# Patient Record
Sex: FEMALE | Race: WHITE | NOT HISPANIC OR LATINO | URBAN - METROPOLITAN AREA
[De-identification: names, ages, dates, MRNs, and addresses within clinical notes are randomized per-mention and may not be internally consistent; named-entity substitution may affect disease eponyms.]

---

## 2024-01-21 ENCOUNTER — EMERGENCY (EMERGENCY)
Facility: HOSPITAL | Age: 34
LOS: 1 days | Discharge: HOME | End: 2024-01-21
Admitting: EMERGENCY MEDICINE
Payer: COMMERCIAL

## 2024-01-21 VITALS
HEART RATE: 93 BPM | WEIGHT: 164.91 LBS | DIASTOLIC BLOOD PRESSURE: 82 MMHG | TEMPERATURE: 99 F | OXYGEN SATURATION: 98 % | SYSTOLIC BLOOD PRESSURE: 139 MMHG | RESPIRATION RATE: 18 BRPM

## 2024-01-21 DIAGNOSIS — R07.81 PLEURODYNIA: ICD-10-CM

## 2024-01-21 DIAGNOSIS — S22.31XA FRACTURE OF ONE RIB, RIGHT SIDE, INITIAL ENCOUNTER FOR CLOSED FRACTURE: ICD-10-CM
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
## 2024-01-21 PROCEDURE — 71101 X-RAY EXAM UNILAT RIBS/CHEST: CPT | Mod: 26,RT

## 2024-01-21 PROCEDURE — 71101 X-RAY EXAM UNILAT RIBS/CHEST: CPT

## 2024-01-21 PROCEDURE — 96372 THER/PROPH/DIAG INJ SC/IM: CPT

## 2024-01-21 PROCEDURE — 99283 EMERGENCY DEPT VISIT LOW MDM: CPT | Mod: 25

## 2024-01-21 PROCEDURE — 99284 EMERGENCY DEPT VISIT MOD MDM: CPT

## 2024-01-21 RX ORDER — OXYCODONE AND ACETAMINOPHEN 5; 325 MG/1; MG/1
1 TABLET ORAL
Qty: 10 | Refills: 0
Start: 2024-01-21 | End: 2024-01-23

## 2024-01-21 RX ORDER — KETOROLAC TROMETHAMINE 30 MG/ML
15 SYRINGE (ML) INJECTION ONCE
Refills: 0 | Status: DISCONTINUED | OUTPATIENT
Start: 2024-01-21 | End: 2024-01-21

## 2024-01-21 RX ORDER — LIDOCAINE 4 G/100G
1 CREAM TOPICAL ONCE
Refills: 0 | Status: COMPLETED | OUTPATIENT
Start: 2024-01-21 | End: 2024-01-21

## 2024-01-21 RX ORDER — LIDOCAINE 4 G/100G
1 CREAM TOPICAL
Qty: 2 | Refills: 0
Start: 2024-01-21

## 2024-01-21 RX ADMIN — Medication 15 MILLIGRAM(S): at 18:55

## 2024-01-21 RX ADMIN — LIDOCAINE 1 PATCH: 4 CREAM TOPICAL at 18:54

## 2024-01-21 NOTE — ED PROVIDER NOTE - PATIENT PORTAL LINK FT
You can access the FollowMyHealth Patient Portal offered by [facility] by registering at the following website: [email or web address]. By joining [email or web address]’s FollowMyHealth portal, you will also be able to view your health information using other applications (apps) compatible with our system.

## 2024-01-21 NOTE — ED PROVIDER NOTE - NS ED ROS FT
Constitutional: No fever. No chills.  Eyes: No redness. No discharge. No vision change.   ENT: No sore throat. No ear pain.  Cardiovascular: No chest pain. No leg swelling.  Respiratory: No cough. No shortness of breath. +rib pain.  GI: No abdominal pain. No vomiting. No diarrhea.   MSK: No joint pain. No back pain.   Skin: No rash. No abrasions.   Neuro: No numbness. No weakness.   Psych: No known mental health issues. stairs to enter home/stairs within home

## 2024-01-21 NOTE — ED ADULT NURSE NOTE - ALCOHOL PRE SCREEN (AUDIT - C)
I sent in antiviral----does she want Lyrica or gabapentin for the pain?  
JANIE RN called stating that she believes pt has shingles on R shoulder down into arm and chest. C/o pain x 1 wk, rash is more recent.  [facility].     Pt has appt 8/14.  
LM asking Reta if pain med needed.   
Statement Selected

## 2024-01-21 NOTE — ED PROVIDER NOTE - OBJECTIVE STATEMENT
34yo female with no reported pmhx presents with right rib pain today. Pt reports fall on [place], does not remember event due to intoxication. Had mild rib pain since that time. Today states she turned and abruptly felt a pop with immediate pain in the right anterior ribs. Denies fever, chills, chest pain, shortness of breath, abdominal pain, vomiting. Has not taken anything for pain.

## 2024-01-21 NOTE — ED PROVIDER NOTE - CLINICAL SUMMARY MEDICAL DECISION MAKING FREE TEXT BOX
Pt is afebrile and hemodynamically stable. No hypoxia. Lungs CTAB. She has reproducible pain over the right anterior lower ribs. She has right 5th rib fracture on xray. Pain controlled in ED with percocet and ibuprofen. Will continue supportive care. Will f/u with pmd. Return precautions given.

## 2024-01-21 NOTE — ED PROVIDER NOTE - PHYSICAL EXAMINATION
VITAL SIGNS: I have reviewed nursing notes and confirm.  CONSTITUTIONAL: Well-developed; in no acute distress.   SKIN:  warm and dry, no acute rash.   HEAD:  normocephalic, atraumatic.  EYES: PERRL, EOM intact; conjunctiva and sclera clear.  ENT: No nasal discharge; airway clear.   NECK: Supple; non tender.  CARD: S1, S2 normal; no murmurs, gallops, or rubs. Regular rate and rhythm.   RESP:  Clear to auscultation b/l, no wheezes, rales or rhonchi. TTP anterior lower right ribs without step off or deformity.   ABD: Normal bowel sounds; soft; non-distended; non-tender; no guarding/ rebound.  EXT: Normal ROM. No clubbing, cyanosis or edema. 2+ pulses to b/l ue/le.  NEURO: Alert, oriented, grossly unremarkable  PSYCH: Cooperative, mood and affect appropriate.

## 2024-01-21 NOTE — ED PROVIDER NOTE - NSFOLLOWUPINSTRUCTIONS_ED_ALL_ED_FT
Take ibuprofen 600mg up to three times per day for pain. Take one tab of percocet up to three times a day for severe pain. Do not drive while taking this medication.    Apply a lidocaine patch to area once daily for additional relief.    You can use ice packs or moist heat for pain, whichever feels best.    WHAT YOU NEED TO KNOW:    What is a rib fracture? A rib fracture is a crack or break in a rib.  Rib Fracture    What else do I need to know about rib fractures?    The most common cause is blunt trauma from a fall or car accident. Trauma can increase your risk for organ damage when your rib is fractured.    Older age, osteoporosis, or a tumor can increase your risk for rib fractures.    A stress fracture can happen in your upper or middle ribs. Stress fractures can happen when you have a forceful long-term cough. They can also be caused by forceful athletic movements, such as in golf, throwing, or rowing.    A condition called flail chest occurs if 3 or more of your ribs are broken in 2 or more places. This condition may make it hard for you to breathe.  What are the signs and symptoms of a rib fracture?    Chest wall pain that worsens when you breathe, move, or cough    Bruising or swelling near your injury    Shortness of breath or difficulty taking a deep breath  How is a rib fracture diagnosed? Your healthcare provider will ask about your injury and examine you. He or she will look for any signs of bleeding or bruising. He or she will also ask about your breathing and pain. An x-ray or CT scan may show the fracture or other injuries. You may be given contrast liquid to help the fracture show up better in the pictures. Tell the healthcare provider if you have ever had an allergic reaction to contrast liquid.    How is a rib fracture treated?    Medicines:  NSAIDs, such as ibuprofen, help decrease swelling, pain, and fever. This medicine is available with or without a doctor's order. NSAIDs can cause stomach bleeding or kidney problems in certain people. If you take blood thinner medicine, always ask your healthcare provider if NSAIDs are safe for you. Always read the medicine label and follow directions.    Prescription pain medicine may be given. Ask your healthcare provider how to take this medicine safely. Some prescription pain medicines contain acetaminophen. Do not take other medicines that contain acetaminophen without talking to your healthcare provider. Too much acetaminophen may cause liver damage. Prescription pain medicine may cause constipation. Ask your healthcare provider how to prevent or treat constipation.    Intercostal nerve block may be given to numb the injured area for about 6 hours. It is given as a shot between 2 of your ribs in the fractured area. You may need this if your pain continues or is getting worse even after you take oral pain medicines.    Surgery may be needed if your rib fracture is severe or several ribs are badly broken. Surgery is often needed for a flail chest.  How can I manage my symptoms?    Take deep breaths and cough 10 times each hour. This will decrease your risk for a lung infection. Hug a pillow on your injured side to decrease pain while you take deep breaths. Take a deep breath and hold it for as long as you can. Let the air out and then cough. Deep breaths help open your airway. You may be given an incentive spirometer to help you take deep breaths. Put the plastic piece in your mouth and take a slow, deep breath, then let the air out and cough. Repeat these steps 10 times every hour.  How to use and Incentive Spirometer      Rest and limit activity as directed. Do not pull, push, or lift objects. Start to do more as your pain decreases. Ask your healthcare provider how much activity you can do.    Apply ice on your chest near your fractured rib for 15 to 20 minutes every hour or as directed. Use an ice pack, or put crushed ice in a plastic bag. Cover it with a towel. Ice helps prevent tissue damage and decreases swelling and pain.  Call your local emergency number (911 in the [place]) if:    You have trouble breathing.    You have new or increased pain.  When should I seek immediate care?    Your pain does not get better, even after treatment.    You have a fever or a cough.  When should I call my doctor?    You have questions or concerns about your condition or care.

## 2024-01-21 NOTE — ED ADULT NURSE NOTE - OBJECTIVE STATEMENT
patient arrived to ED c/o right sided rib pain. per patient felt a "pop" sensation. denies fever, cough, shortness of breath. endorses pain when inhaling and exhaling. no respiratory distress, speaking in full sentences, on room air. endorses nausea due to pain.

## 2024-01-21 NOTE — ED ADULT NURSE NOTE - NSFALLRISKINTERV_ED_ALL_ED

## 2024-05-22 NOTE — ED ADULT NURSE NOTE - NS ED NURSE LEVEL OF CONSCIOUSNESS SPEECH
PROCEDURAL SEDATION ASSESSMENT    Procedure date: 5/24/24    Indications for Procedure/Preoperative Diagnosis: screen.    Planned Procedure: Colonsoscopy.    Past Medical History:   Diagnosis Date    Breast cancer  (CMD) 2023    High risk HPV infection     Pap normal, HPV + 2015    Menorrhagia        Past Surgical History:   Procedure Laterality Date    Mastectomy,simple Bilateral 04/20/2023    left sentinal node biopsy    Us axilla lymph node biopsy 1 lesion left Left 03/08/2023    clip #3 placed    Us axilla lymph node biopsy 1 lesion left Left 03/14/2023    clip #4 placed.    Us axilla lymph node biopsy 1 lesion right Right 03/08/2023    clip #4 placed in axilla lymph node    Us guided breast core biopsy Left 02/27/2023    clip #1    Us guided breast core biopsy Right 03/08/2023    clip #3 placed       Allergies:  ALLERGIES:  No Known Allergies      Family History: No history of colorectal cancer          Home Medication list:  No current facility-administered medications for this encounter.     Current Outpatient Medications   Medication Sig Dispense Refill    tamoxifen (NOLVADEX) 10 MG tablet Take 1 tablet by mouth in the morning and 1 tablet in the evening. 180 tablet 3    CALCIUM CITRATE-VITAMIN D PO daily      Multiple Vitamins-Minerals (MULTIVITAMIN ADULTS PO) Take 1 tablet by mouth daily.         Anticoagulation: No    PHYSICAL EXAM:  General: Alert  Heart: Regular rate and rhythm  Lungs: Clear to auscultataion  Mental status: Oriented  Abdomen: Soft    OTHER FINDINGS:  Reviewed current medications and allergies: YES  Pertinent lab/diagnostic tests reviewed: YES      PLAN FOR SEDATION: MAC    REASSESSMENT IMMEDIATELY PRIOR TO PROCEDURE: Patient remains a candidate for the planned sedation and procedure.    Risks/Benefits/Alternatives to Procedure and Moderate Sedation have been discussed with patient and/or patient's representative. Patient has given consent to proceed.    Assessing Physician: Ramon DAVIS  MD Zina                             Speaking Coherently